# Patient Record
Sex: FEMALE | Race: BLACK OR AFRICAN AMERICAN | NOT HISPANIC OR LATINO | Employment: FULL TIME | URBAN - METROPOLITAN AREA
[De-identification: names, ages, dates, MRNs, and addresses within clinical notes are randomized per-mention and may not be internally consistent; named-entity substitution may affect disease eponyms.]

---

## 2024-04-02 PROBLEM — N76.0 ACUTE VAGINITIS: Status: ACTIVE | Noted: 2024-04-02

## 2024-06-06 NOTE — PROGRESS NOTES
Breast Surgical Oncology  [place]    Date of Service: 2024    SUBJECTIVE:   Chief complaint: left axillary mass    HISTORY OF PRESENT ILLNESS:   Mendy Villalobos is a 44 y.o. female who is kindly referred by Dr. Karl Gloria for left axillary mass.    The patient reports a left axillary tail mass for years. The mass has increased in size and is now causing discomfort and cosmetic deformity. She denies breast concerns such as pain, masses, skin changes, nipple discharge, nipple retraction or lumps under the arm. She has had a large skin tag of her left axilla for years as well.      Her breast cancer risk factor profile is as follows: Menarche at 12, Menopause at N/A.  She is . Age at first live birth was 19. Family history of cancer is as follows: sister with breast cancer at age 37, current age is unknown.    FAMILY HISTORY:     Family History   Problem Relation Name Age of Onset    Breast cancer Sister  37        PAST MEDICAL HISTORY:     Past Medical History:   Diagnosis Date    ASCUS of cervix with negative high risk HPV 05/10/2023    Yeast- Diflucan erx: repap in 1 year    Genetic screening 2023    BRACA/My Risk- Negative    Hypothyroidism, unspecified        SURGICAL HISTORY:     Past Surgical History:   Procedure Laterality Date    CHOLECYSTECTOMY      TUBAL LIGATION         SOCIAL HISTORY:     Social History     Tobacco Use    Smoking status: Never    Smokeless tobacco: Never   Substance Use Topics    Alcohol use: Not Currently    Drug use: Not Currently        MEDICATIONS/ALLERGIES:     Current Outpatient Medications:     fluconazole (DIFLUCAN) 150 MG Tab, Take 1 tablet (150 mg total) by mouth Every 3 (three) days., Disp: 2 tablet, Rfl: 1    levothyroxine (SYNTHROID) 50 MCG tablet, Take 1 tablet by mouth every morning., Disp: , Rfl:   Review of patient's allergies indicates:  No Known Allergies    REVIEW OF SYSTEMS:   I have reviewed 12 systems, including 2 points per system. Pertinent  reported positives are: unexpected weight change, frequent urination, shortness of breath    PHYSICAL EXAM:   General: The patient appears well and is in no acute distress.     Joyce Cramer MA was present as a chaperone for the examination.   BREAST EXAM  No Asymmetry  Right:  - Mass: No  - Skin change: No  - Nipple Discharge: No  - Nipple retraction: No  - Axillary LAD: No  Left:   - Mass: 2 x 2 cm mass axillary tail consistent with a lipoma, tender  - Skin change: skin tag L axilla  - Nipple Discharge: No  - Nipple retraction: No  - Axillary LAD: No    IMAGING:   Images were personally reviewed.     Results for orders placed in visit on 05/15/24    Mammo Digital Screening Bilat w/ Jose    Narrative  Result:  Mammo Digital Screening Bilat w/ Jose    History:  Patient is 44 y.o. and is seen for a screening mammogram.      Films Compared:  Compared to: 05/10/2023 Mammo Digital Screening Bilat w/ Jose    Findings:  This procedure was performed using tomosynthesis.  Computer-aided detection was utilized in the interpretation of this examination.    The breasts are heterogeneously dense, which may obscure small masses. There is no evidence of suspicious masses, microcalcifications or architectural distortion.    Impression  No mammographic evidence of malignancy.    BI-RADS Category 1: Negative    Recommendation:  Routine screening mammogram in 1 year is recommended.    PATHOLOGY:   none    ASSESSMENT:     1. Unspecified lump in axillary tail of the left breast    2. Acquired skin tag          PLAN:     Because of her symptoms and cosmesis, she desires surgical excision of her left breast mass and skin tag. She understands that her pain may not resolve following surgery; however, she will have improved cosmesis. The risks benefits and alternatives to surgery have been discussed.  The risks include bout are not limited to pain, bleeding, infection, scarring, cosmetic deformity, nondiagnostic biopsy and need for other  procedures and/or treatments. She has provided informed consent. She will be scheduled at the next available operating room time.     Her lifetime risk of breast cancer via the [place]rer-[place] v8 score is 25.3%; therefore, she is high risk of breast cancer. She will resume high risk screening following surgery    I spent a total of 45 minutes on this visit. This includes face to face time and non-face to face time preparing to see the patient (eg, review of tests), obtaining and/or reviewing separately obtained history, documenting clinical information in the electronic or other health record, independently interpreting results and communicating results to the patient/family/caregiver, or care coordinator.        Zahraa Patton M.D.

## 2024-06-07 ENCOUNTER — OFFICE VISIT (OUTPATIENT)
Dept: SURGERY | Facility: CLINIC | Age: 44
End: 2024-06-07
Payer: COMMERCIAL

## 2024-06-07 DIAGNOSIS — N63.32 UNSPECIFIED LUMP IN AXILLARY TAIL OF THE LEFT BREAST: Primary | ICD-10-CM

## 2024-06-07 DIAGNOSIS — L91.8 ACQUIRED SKIN TAG: ICD-10-CM

## 2024-06-07 PROCEDURE — 99204 OFFICE O/P NEW MOD 45 MIN: CPT | Mod: S$GLB,,, | Performed by: SURGERY

## 2024-06-07 PROCEDURE — 99999 PR PBB SHADOW E&M-EST. PATIENT-LVL I: CPT | Mod: PBBFAC,,, | Performed by: SURGERY

## 2024-06-10 DIAGNOSIS — N63.32 UNSPECIFIED LUMP IN AXILLARY TAIL OF THE LEFT BREAST: Primary | ICD-10-CM

## 2024-06-28 RX ORDER — TRAMADOL HYDROCHLORIDE 50 MG/1
50 TABLET ORAL EVERY 6 HOURS PRN
Qty: 15 TABLET | Refills: 0 | Status: SHIPPED | OUTPATIENT
Start: 2024-06-28

## 2024-06-28 RX ORDER — ONDANSETRON 8 MG/1
8 TABLET, ORALLY DISINTEGRATING ORAL EVERY 8 HOURS PRN
Qty: 12 TABLET | Refills: 2 | Status: SHIPPED | OUTPATIENT
Start: 2024-06-28

## 2024-07-11 NOTE — PROGRESS NOTES
Breast Surgical Oncology  [place]    Date of Service: 2024    SUBJECTIVE:   Chief complaint: left axillary mass and skin tag excision 2024- [facility]    HISTORY OF PRESENT ILLNESS:   Mendy Villalobos is a 44 y.o. female who is kindly referred by Dr. Karl Gloria for left axillary mass.    2024  The patient reports a left axillary tail mass for years. The mass had increased in size and is now causing discomfort and cosmetic deformity. She denies breast concerns such as pain, masses, skin changes, nipple discharge, nipple retraction or lumps under the arm. She has had a large skin tag of her left axilla for years as well.      Her breast cancer risk factor profile is as follows: Menarche at 12, Menopause at N/A.  She is . Age at first live birth was 19. Family history of cancer is as follows: sister with breast cancer at age 37, current age is unknown.    Today:  S/p left axillary mass and skin tag excision 2024 at [facility]    Fever- none  Pain- none  Drainage- none  Swelling - slight swelling under incision-     FAMILY HISTORY:     Family History   Problem Relation Name Age of Onset    Breast cancer Sister  37        PAST MEDICAL HISTORY:     Past Medical History:   Diagnosis Date    ASCUS of cervix with negative high risk HPV 05/10/2023    Yeast- Diflucan erx: repap in 1 year    Genetic screening 2023    BRACA/My Risk- Negative    Hypothyroidism, unspecified        SURGICAL HISTORY:     Past Surgical History:   Procedure Laterality Date    CHOLECYSTECTOMY      TUBAL LIGATION         SOCIAL HISTORY:     Social History     Tobacco Use    Smoking status: Never    Smokeless tobacco: Never   Substance Use Topics    Alcohol use: Not Currently    Drug use: Not Currently        MEDICATIONS/ALLERGIES:     Current Outpatient Medications:     fluconazole (DIFLUCAN) 150 MG Tab, Take 1 tablet (150 mg total) by mouth Every 3 (three) days., Disp: 2 tablet, Rfl: 1    levothyroxine (SYNTHROID)  50 MCG tablet, Take 1 tablet by mouth every morning., Disp: , Rfl:     ondansetron (ZOFRAN-ODT) 8 MG TbDL, Take 1 tablet (8 mg total) by mouth every 8 (eight) hours as needed (Nausea)., Disp: 12 tablet, Rfl: 2    traMADoL (ULTRAM) 50 mg tablet, Take 1 tablet (50 mg total) by mouth every 6 (six) hours as needed for Pain., Disp: 15 tablet, Rfl: 0  Review of patient's allergies indicates:  No Known Allergies    REVIEW OF SYSTEMS:   I have reviewed 12 systems, including 2 points per system. Pertinent reported positives are: unexpected weight change, frequent urination, shortness of breath    PHYSICAL EXAM:   General: The patient appears well and is in no acute distress.     BREAST EXAM  No Asymmetry  Right:  - Mass: No  - Skin change: No  - Nipple Discharge: No  - Nipple retraction: No  - Axillary LAD: No  Left:   - Mass: no palpable mass- has soft prominence under left axilla without fluctuation. No drainage or redness- skin edges well approximated  - Skin change: no  - Nipple Discharge: No  - Nipple retraction: No  - Axillary LAD: No    IMAGING:     Results for orders placed in visit on 05/15/24    Mammo Digital Screening Bilat w/ Jose    Narrative  Result:  Mammo Digital Screening Bilat w/ Jose    History:  Patient is 44 y.o. and is seen for a screening mammogram.      Films Compared:  Compared to: 05/10/2023 Mammo Digital Screening Bilat w/ Jose    Findings:  This procedure was performed using tomosynthesis.  Computer-aided detection was utilized in the interpretation of this examination.    The breasts are heterogeneously dense, which may obscure small masses. There is no evidence of suspicious masses, microcalcifications or architectural distortion.    Impression  No mammographic evidence of malignancy.    BI-RADS Category 1: Negative    Recommendation:  Routine screening mammogram in 1 year is recommended.    PATHOLOGY:   6/28/2024  Final Diagnosis    LEFT AXILLARY SKIN TAG:  Fibroepithelial polyp.     2.    LEFT  AXILLARY MASS:  Mature adipose tissue consistent with lipoma, see comment.  Scant benign breast parenchyma.     Comment  Analysis for MDM2 is without evidence of amplification. This does not suggest a potential atypical or malignant lipomatous neoplasm.       ASSESSMENT:     1. Lipoma of left axilla    2. Postop check    3. Counseling and coordination of care    4. Counseling on health promotion and disease prevention            PLAN:   Reviewed path with pt    Incision healing well- suture removed-pt is to continue with no soaking area for another 2 weeks and no heavy lifting for 2 weeks- she will call me if she notes any drainage or redness in the area    Her lifetime risk of breast cancer via the Tyrer-[place] v8 score is 25.3%; therefore, she is high risk of breast cancer. She will resume high risk screening.     Recommend [facility] breast MRI in November with exam    Bilateral mammo May 2025 with exam    Reviewed importance of diet and exercise and maintaining a healthy wt in reduction of breast cancer risk    I spent a total of 30 minutes on this visit. This includes face to face time and non-face to face time preparing to see the patient (eg, review of tests), obtaining and/or reviewing separately obtained history, documenting clinical information in the electronic or other health record, independently interpreting results and communicating results to the patient/family/caregiver, or care coordinator.        Asmita Crisostomo

## 2024-07-16 ENCOUNTER — OFFICE VISIT (OUTPATIENT)
Dept: SURGERY | Facility: CLINIC | Age: 44
End: 2024-07-16
Payer: COMMERCIAL

## 2024-07-16 VITALS — HEIGHT: 66 IN | WEIGHT: 191.38 LBS | BODY MASS INDEX: 30.76 KG/M2

## 2024-07-16 DIAGNOSIS — Z71.89 COUNSELING AND COORDINATION OF CARE: ICD-10-CM

## 2024-07-16 DIAGNOSIS — Z09 POSTOP CHECK: ICD-10-CM

## 2024-07-16 DIAGNOSIS — Z71.89 COUNSELING ON HEALTH PROMOTION AND DISEASE PREVENTION: ICD-10-CM

## 2024-07-16 DIAGNOSIS — D17.22 LIPOMA OF LEFT AXILLA: Primary | ICD-10-CM

## 2024-07-16 DIAGNOSIS — Z91.89 AT HIGH RISK FOR BREAST CANCER: ICD-10-CM

## 2024-07-16 PROCEDURE — 1160F RVW MEDS BY RX/DR IN RCRD: CPT | Mod: CPTII,S$GLB,, | Performed by: NURSE PRACTITIONER

## 2024-07-16 PROCEDURE — 99999 PR PBB SHADOW E&M-EST. PATIENT-LVL III: CPT | Mod: PBBFAC,,, | Performed by: NURSE PRACTITIONER

## 2024-07-16 PROCEDURE — 99024 POSTOP FOLLOW-UP VISIT: CPT | Mod: S$GLB,,, | Performed by: NURSE PRACTITIONER

## 2024-07-16 PROCEDURE — 1159F MED LIST DOCD IN RCRD: CPT | Mod: CPTII,S$GLB,, | Performed by: NURSE PRACTITIONER

## 2024-07-22 ENCOUNTER — OFFICE VISIT (OUTPATIENT)
Dept: SURGERY | Facility: CLINIC | Age: 44
End: 2024-07-22
Payer: COMMERCIAL

## 2024-07-22 VITALS — TEMPERATURE: 98 F | WEIGHT: 195.75 LBS | BODY MASS INDEX: 31.6 KG/M2

## 2024-07-22 DIAGNOSIS — B37.2 CANDIDIASIS, INTERTRIGO: ICD-10-CM

## 2024-07-22 DIAGNOSIS — R21 RASH: Primary | ICD-10-CM

## 2024-07-22 PROCEDURE — 99024 POSTOP FOLLOW-UP VISIT: CPT | Mod: S$GLB,,, | Performed by: NURSE PRACTITIONER

## 2024-07-22 PROCEDURE — 99999 PR PBB SHADOW E&M-EST. PATIENT-LVL II: CPT | Mod: PBBFAC,,, | Performed by: NURSE PRACTITIONER

## 2024-07-22 RX ORDER — KETOCONAZOLE 20 MG/G
CREAM TOPICAL DAILY
Qty: 30 G | Refills: 0 | Status: SHIPPED | OUTPATIENT
Start: 2024-07-22

## 2024-07-22 NOTE — PROGRESS NOTES
Breast Surgical Oncology  [place]    Date of Service: 2024    SUBJECTIVE:   Chief complaint: left axillary mass and skin tag excision 2024- [facility]    HISTORY OF PRESENT ILLNESS:   Mendy Villalobos is a 44 y.o. female who is kindly referred by Dr. Karl Gloria for left axillary mass.        2024  The patient reports a left axillary tail mass for years. The mass had increased in size and is now causing discomfort and cosmetic deformity. She denies breast concerns such as pain, masses, skin changes, nipple discharge, nipple retraction or lumps under the arm. She has had a large skin tag of her left axilla for years as well.      Her breast cancer risk factor profile is as follows: Menarche at 12, Menopause at N/A.  She is . Age at first live birth was 19. Family history of cancer is as follows: sister with breast cancer at age 37, current age is unknown.    2024  S/p left axillary mass and skin tag excision 2024 at [facility]    Fever- none  Pain- none  Drainage- none  Swelling - slight swelling under incision-     Today  Pt relates 5 days ago noting rash under her left breast- red patchy and warm to touch. No drainage. Just under breast medially. Yesterday noted left axilla rash below the incision. Itchy. No drainage and no spread. Has not tried any topicals. No fever.        FAMILY HISTORY:     Family History   Problem Relation Name Age of Onset    Breast cancer Sister  37        PAST MEDICAL HISTORY:     Past Medical History:   Diagnosis Date    ASCUS of cervix with negative high risk HPV 05/10/2023    Yeast- Diflucan erx: repap in 1 year    Genetic screening 2023    BRACA/My Risk- Negative    Hypothyroidism, unspecified        SURGICAL HISTORY:     Past Surgical History:   Procedure Laterality Date    CHOLECYSTECTOMY      TUBAL LIGATION         SOCIAL HISTORY:     Social History     Tobacco Use    Smoking status: Never    Smokeless tobacco: Never   Substance Use Topics     Alcohol use: Not Currently    Drug use: Not Currently        MEDICATIONS/ALLERGIES:     Current Outpatient Medications:     fluconazole (DIFLUCAN) 150 MG Tab, Take 1 tablet (150 mg total) by mouth Every 3 (three) days., Disp: 2 tablet, Rfl: 1    ketoconazole (NIZORAL) 2 % cream, Apply topically once daily., Disp: 30 g, Rfl: 0    levothyroxine (SYNTHROID) 50 MCG tablet, Take 1 tablet by mouth every morning., Disp: , Rfl:     ondansetron (ZOFRAN-ODT) 8 MG TbDL, Take 1 tablet (8 mg total) by mouth every 8 (eight) hours as needed (Nausea)., Disp: 12 tablet, Rfl: 2    traMADoL (ULTRAM) 50 mg tablet, Take 1 tablet (50 mg total) by mouth every 6 (six) hours as needed for Pain., Disp: 15 tablet, Rfl: 0  Review of patient's allergies indicates:   Allergen Reactions    No-sting skin-prep [[id number]]      rash       REVIEW OF SYSTEMS:   I have reviewed 12 systems, including 2 points per system. Pertinent reported positives are: unexpected weight change, frequent urination, shortness of breath    PHYSICAL EXAM:   General: The patient appears well and is in no acute distress.     BREAST EXAM  No Asymmetry  Right:  - Mass: No  - Skin change: No  - Nipple Discharge: No  - Nipple retraction: No  - Axillary LAD: No  Left:   - Mass: no palpable mass- has soft prominence under left axilla without fluctuation. No drainage or redness- skin edges well approximated  - Skin change: under left breast there is a patch of raised pink rash without drainage. Dark in color.   - Nipple Discharge: No  - Nipple retraction: No  - Axillary LAD: left axillary incision- below the incision there is a fine pink papular rash with no drainage or induration    IMAGING:     Results for orders placed in visit on 05/15/24    Mammo Digital Screening Bilat w/ Jose    Narrative  Result:  Mammo Digital Screening Bilat w/ Jose    History:  Patient is 44 y.o. and is seen for a screening mammogram.      Films Compared:  Compared to: 05/10/2023  Mammo Digital Screening Bilat w/ Jose    Findings:  This procedure was performed using tomosynthesis.  Computer-aided detection was utilized in the interpretation of this examination.    The breasts are heterogeneously dense, which may obscure small masses. There is no evidence of suspicious masses, microcalcifications or architectural distortion.    Impression  No mammographic evidence of malignancy.    BI-RADS Category 1: Negative    Recommendation:  Routine screening mammogram in 1 year is recommended.    PATHOLOGY:   6/28/2024  Final Diagnosis    LEFT AXILLARY SKIN TAG:  Fibroepithelial polyp.     2.    LEFT AXILLARY MASS:  Mature adipose tissue consistent with lipoma, see comment.  Scant benign breast parenchyma.     Comment  Analysis for MDM2 is without evidence of amplification. This does not suggest a potential atypical or malignant lipomatous neoplasm.       ASSESSMENT:     1. Rash    2. Candidiasis, intertrigo              PLAN:       Intertrigal candidiasis- left breast- may use topical antifungal creams, sprays or powders over the counter or ketoconazole 2% topical cream daily on the patchy rash under the breast- discussed cause of it and how to avoid in future    Rash below incision - dermatitis- possible reaction to the skin prep vs deoderant use - recommend over the counter cortisone cream- if no improvement or if getting worse over time let me know for sooner appt    Her lifetime risk of breast cancer via the [place]rer-[place] v8 score is 25.3%; therefore, she is high risk of breast cancer. She will resume high risk screening.     Recommend [facility] breast MRI in November with exam    Bilateral mammo May 2025 with exam    Reviewed importance of diet and exercise and maintaining a healthy wt in reduction of breast cancer risk    I spent a total of 30 minutes on this visit. This includes face to face time and non-face to face time preparing to see the patient (eg, review of tests), obtaining and/or reviewing  separately obtained history, documenting clinical information in the electronic or other health record, independently interpreting results and communicating results to the patient/family/caregiver, or care coordinator.        Asmita Crisostomo

## 2025-01-27 ENCOUNTER — TELEPHONE (OUTPATIENT)
Dept: SURGERY | Facility: CLINIC | Age: 45
End: 2025-01-27
Payer: COMMERCIAL

## 2025-01-27 NOTE — TELEPHONE ENCOUNTER
Left vm for patient to return phone call to office regarding 2/17/25 appt w/ NP Asmita Crisostomo. Explained that provider is currently out on medical leave, and at this time, we do not have a date of return for her. Ask for patient to return phone call to office to schedule with another provider in our office.

## 2025-01-29 ENCOUNTER — TELEPHONE (OUTPATIENT)
Dept: SURGERY | Facility: CLINIC | Age: 45
End: 2025-01-29
Payer: COMMERCIAL

## 2025-01-29 NOTE — TELEPHONE ENCOUNTER
(2nd attempt)Left vm for patient to return phone call to office regarding 2/17/25 appt with Asmita Crisostomo.

## 2025-01-30 ENCOUNTER — TELEPHONE (OUTPATIENT)
Dept: SURGERY | Facility: CLINIC | Age: 45
End: 2025-01-30
Payer: COMMERCIAL

## 2025-01-30 NOTE — TELEPHONE ENCOUNTER
3rd attempt to contact patient regarding 2/18/25 appt with Asmita Crisostomo. Left vm to return phone call to office.